# Patient Record
Sex: FEMALE | Race: BLACK OR AFRICAN AMERICAN | NOT HISPANIC OR LATINO | URBAN - METROPOLITAN AREA
[De-identification: names, ages, dates, MRNs, and addresses within clinical notes are randomized per-mention and may not be internally consistent; named-entity substitution may affect disease eponyms.]

---

## 2018-06-19 ENCOUNTER — EMERGENCY (EMERGENCY)
Facility: HOSPITAL | Age: 53
LOS: 1 days | Discharge: HOME | End: 2018-06-19
Attending: EMERGENCY MEDICINE
Payer: SELF-PAY

## 2018-06-19 VITALS
RESPIRATION RATE: 14 BRPM | SYSTOLIC BLOOD PRESSURE: 150 MMHG | WEIGHT: 190.04 LBS | OXYGEN SATURATION: 97 % | HEART RATE: 68 BPM | DIASTOLIC BLOOD PRESSURE: 60 MMHG | TEMPERATURE: 98 F

## 2018-06-19 PROCEDURE — 99283 EMERGENCY DEPT VISIT LOW MDM: CPT

## 2018-06-19 RX ORDER — ACETAMINOPHEN 500 MG
650 TABLET ORAL ONCE
Qty: 0 | Refills: 0 | Status: COMPLETED | OUTPATIENT
Start: 2018-06-19 | End: 2018-06-19

## 2018-06-19 RX ORDER — SULFACETAMIDE SODIUM 10 %
1 DROPS OPHTHALMIC (EYE)
Qty: 1 | Refills: 0 | OUTPATIENT
Start: 2018-06-19 | End: 2018-06-25

## 2018-06-19 RX ADMIN — Medication 650 MILLIGRAM(S): at 14:44

## 2018-06-19 NOTE — ED PROVIDER NOTE - PROGRESS NOTE DETAILS
left corneal abrasion, rx opthalmic abx sent to pharmacy, given information for Dr Ruiz, opthalmologist, given 1 bottle of eyewash, advised otc tylenol or motrin as directed for pain, any concerns return to ed

## 2018-06-19 NOTE — ED PROVIDER NOTE - ATTENDING CONTRIBUTION TO CARE
I have personally performed a face to face diagnostic evaluation on this patient.  I have reviewed the PA note and agree with the history, exam, and plan of care, except as noted.  History and Exam by me shows  left eye irritated today p fb sensation, but rinse it out water.  Left eye: conjunctival injection c linear superficial abrasion at 3 o'clock.

## 2018-06-19 NOTE — ED PROVIDER NOTE - OBJECTIVE STATEMENT
53 y female states she rubbed her left  eye today, states she felt something in it, she washed her eye out with water.  then began to feel pain with movement of the eye , does not wear glasses or contacts,  utd on tetanus.  nonsmoker. 53 y female states she rubbed her left  eye today, states she felt something in it, she washed her eye out with water.  then began to feel pain with movement of the eye , does not wear glasses or contacts,  utd on tetanus.  nonsmoker.  no other complaint. 53 y female states she rubbed her left  eye today, states she felt something in it, she washed her eye out with water.  then began to feel pain with movement of the eye , does not wear glasses or contacts,  utd on tetanus.  nonsmoker.  vision is clear.

## 2018-06-19 NOTE — ED PROVIDER NOTE - CHPI ED SYMPTOMS NEG
no photophobia/no purulent drainage/no blurred vision/no double vision/no eye lid swelling/no discharge/no foreign body/no itching/no drainage

## 2019-02-19 ENCOUNTER — LABORATORY RESULT (OUTPATIENT)
Age: 54
End: 2019-02-19

## 2019-02-19 ENCOUNTER — APPOINTMENT (OUTPATIENT)
Dept: INTERNAL MEDICINE | Facility: CLINIC | Age: 54
End: 2019-02-19
Payer: MEDICARE

## 2019-02-19 VITALS — DIASTOLIC BLOOD PRESSURE: 76 MMHG | SYSTOLIC BLOOD PRESSURE: 122 MMHG

## 2019-02-19 VITALS
HEIGHT: 65 IN | BODY MASS INDEX: 36.32 KG/M2 | WEIGHT: 218 LBS | HEART RATE: 76 BPM | SYSTOLIC BLOOD PRESSURE: 142 MMHG | TEMPERATURE: 97.9 F | DIASTOLIC BLOOD PRESSURE: 84 MMHG | OXYGEN SATURATION: 97 % | RESPIRATION RATE: 14 BRPM

## 2019-02-19 DIAGNOSIS — Z80.8 FAMILY HISTORY OF MALIGNANT NEOPLASM OF OTHER ORGANS OR SYSTEMS: ICD-10-CM

## 2019-02-19 DIAGNOSIS — Z00.00 ENCOUNTER FOR GENERAL ADULT MEDICAL EXAMINATION W/OUT ABNORMAL FINDINGS: ICD-10-CM

## 2019-02-19 DIAGNOSIS — Z78.9 OTHER SPECIFIED HEALTH STATUS: ICD-10-CM

## 2019-02-19 PROCEDURE — 99386 PREV VISIT NEW AGE 40-64: CPT

## 2019-02-19 PROCEDURE — G0438: CPT

## 2019-02-19 NOTE — PHYSICAL EXAM
[No Acute Distress] : no acute distress [Well Nourished] : well nourished [Well Developed] : well developed [Well-Appearing] : well-appearing [Normal Sclera/Conjunctiva] : normal sclera/conjunctiva [PERRL] : pupils equal round and reactive to light [EOMI] : extraocular movements intact [Normal Outer Ear/Nose] : the outer ears and nose were normal in appearance [Normal Oropharynx] : the oropharynx was normal [Normal TMs] : both tympanic membranes were normal [Normal Nasal Mucosa] : the nasal mucosa was normal [No JVD] : no jugular venous distention [Supple] : supple [No Lymphadenopathy] : no lymphadenopathy [Thyroid Normal, No Nodules] : the thyroid was normal and there were no nodules present [No Respiratory Distress] : no respiratory distress  [Clear to Auscultation] : lungs were clear to auscultation bilaterally [No Accessory Muscle Use] : no accessory muscle use [Normal Rate] : normal rate  [Regular Rhythm] : with a regular rhythm [Normal S1, S2] : normal S1 and S2 [No Murmur] : no murmur heard [No Edema] : there was no peripheral edema [Normal Appearance] : normal in appearance [No Masses] : no palpable masses [Soft] : abdomen soft [Non Tender] : non-tender [Normal Posterior Cervical Nodes] : no posterior cervical lymphadenopathy [Normal Anterior Cervical Nodes] : no anterior cervical lymphadenopathy [No CVA Tenderness] : no CVA  tenderness [No Spinal Tenderness] : no spinal tenderness [No Joint Swelling] : no joint swelling [Grossly Normal Strength/Tone] : grossly normal strength/tone [No Rash] : no rash [No Skin Lesions] : no skin lesions [Normal Gait] : normal gait [Coordination Grossly Intact] : coordination grossly intact [No Focal Deficits] : no focal deficits [Deep Tendon Reflexes (DTR)] : deep tendon reflexes were 2+ and symmetric [Normal Affect] : the affect was normal [Normal Insight/Judgement] : insight and judgment were intact

## 2019-02-19 NOTE — HEALTH RISK ASSESSMENT
[No falls in past year] : Patient reported no falls in the past year [0] : 2) Feeling down, depressed, or hopeless: Not at all (0) [] : No [Change in mental status noted] : No change in mental status noted [Language] : denies difficulty with language

## 2019-02-19 NOTE — COUNSELING
[Weight management counseling provided] : Weight management [Healthy eating counseling provided] : healthy eating [Activity counseling provided] : activity [Good understanding] : Patient has a good understanding of disease, goals and obesity follow-up plan

## 2019-02-19 NOTE — ASSESSMENT
[FreeTextEntry1] : she ate egg and cheese today a couple hours ago, but wants to do blood work today. \par \par needs weight loss

## 2019-02-20 LAB
FOLATE SERPL-MCNC: 9.1 NG/ML
VIT B12 SERPL-MCNC: 496 PG/ML

## 2019-02-21 LAB
25(OH)D3 SERPL-MCNC: 5.3 NG/ML
ALBUMIN SERPL ELPH-MCNC: 4.3 G/DL
ALP BLD-CCNC: 119 U/L
ALT SERPL-CCNC: 16 U/L
ANION GAP SERPL CALC-SCNC: 14 MMOL/L
APPEARANCE: ABNORMAL
AST SERPL-CCNC: 20 U/L
BASOPHILS # BLD AUTO: 0.03 K/UL
BASOPHILS NFR BLD AUTO: 0.4 %
BILIRUB SERPL-MCNC: <0.2 MG/DL
BILIRUBIN URINE: NEGATIVE
BLOOD URINE: NEGATIVE
BUN SERPL-MCNC: 11 MG/DL
CALCIUM SERPL-MCNC: 9.7 MG/DL
CHLORIDE SERPL-SCNC: 104 MMOL/L
CHOLEST SERPL-MCNC: 195 MG/DL
CHOLEST/HDLC SERPL: 3.2 RATIO
CO2 SERPL-SCNC: 23 MMOL/L
COLOR: YELLOW
CREAT SERPL-MCNC: 0.72 MG/DL
EOSINOPHIL # BLD AUTO: 0.12 K/UL
EOSINOPHIL NFR BLD AUTO: 1.7 %
GLUCOSE QUALITATIVE U: NEGATIVE
GLUCOSE SERPL-MCNC: 88 MG/DL
HBA1C MFR BLD HPLC: 5.5 %
HCT VFR BLD CALC: 41.7 %
HDLC SERPL-MCNC: 62 MG/DL
HGB BLD-MCNC: 11.7 G/DL
IMM GRANULOCYTES NFR BLD AUTO: 0.3 %
KETONES URINE: NEGATIVE
LDLC SERPL CALC-MCNC: 72 MG/DL
LEUKOCYTE ESTERASE URINE: ABNORMAL
LYMPHOCYTES # BLD AUTO: 1.87 K/UL
LYMPHOCYTES NFR BLD AUTO: 26.8 %
MAN DIFF?: NORMAL
MCHC RBC-ENTMCNC: 24.2 PG
MCHC RBC-ENTMCNC: 28.1 GM/DL
MCV RBC AUTO: 86.3 FL
MONOCYTES # BLD AUTO: 0.39 K/UL
MONOCYTES NFR BLD AUTO: 5.6 %
NEUTROPHILS # BLD AUTO: 4.54 K/UL
NEUTROPHILS NFR BLD AUTO: 65.2 %
NITRITE URINE: NEGATIVE
PH URINE: 6
PLATELET # BLD AUTO: 288 K/UL
POTASSIUM SERPL-SCNC: 4.5 MMOL/L
PROT SERPL-MCNC: 7.9 G/DL
PROTEIN URINE: NORMAL
RBC # BLD: 4.83 M/UL
RBC # FLD: 15.7 %
SODIUM SERPL-SCNC: 141 MMOL/L
SPECIFIC GRAVITY URINE: 1.03
TRIGL SERPL-MCNC: 305 MG/DL
TSH SERPL-ACNC: 2.43 UIU/ML
URATE SERPL-MCNC: 4 MG/DL
UROBILINOGEN URINE: NORMAL
WBC # FLD AUTO: 6.97 K/UL

## 2019-02-21 RX ORDER — MULTIVIT-MIN/FOLIC/VIT K/LYCOP 400-300MCG
50 MCG TABLET ORAL
Qty: 30 | Refills: 5 | Status: ACTIVE | COMMUNITY
Start: 2019-02-21

## 2019-06-08 ENCOUNTER — APPOINTMENT (OUTPATIENT)
Dept: INTERNAL MEDICINE | Facility: CLINIC | Age: 54
End: 2019-06-08
Payer: COMMERCIAL

## 2019-06-08 VITALS
HEIGHT: 65 IN | DIASTOLIC BLOOD PRESSURE: 90 MMHG | SYSTOLIC BLOOD PRESSURE: 130 MMHG | BODY MASS INDEX: 36.32 KG/M2 | HEART RATE: 79 BPM | WEIGHT: 218 LBS | OXYGEN SATURATION: 99 % | RESPIRATION RATE: 14 BRPM | TEMPERATURE: 97.9 F

## 2019-06-08 VITALS — SYSTOLIC BLOOD PRESSURE: 120 MMHG | DIASTOLIC BLOOD PRESSURE: 86 MMHG

## 2019-06-08 DIAGNOSIS — E55.9 VITAMIN D DEFICIENCY, UNSPECIFIED: ICD-10-CM

## 2019-06-08 DIAGNOSIS — E78.1 PURE HYPERGLYCERIDEMIA: ICD-10-CM

## 2019-06-08 PROCEDURE — 99213 OFFICE O/P EST LOW 20 MIN: CPT

## 2019-06-08 NOTE — PHYSICAL EXAM
[Well Nourished] : well nourished [No Acute Distress] : no acute distress [Clear to Auscultation] : lungs were clear to auscultation bilaterally [No Respiratory Distress] : no respiratory distress  [Well Developed] : well developed [No Edema] : there was no peripheral edema [Normal Rate] : normal rate  [Regular Rhythm] : with a regular rhythm [Non Tender] : non-tender [Soft] : abdomen soft [Normal Affect] : the affect was normal [Normal Insight/Judgement] : insight and judgment were intact

## 2019-06-08 NOTE — HISTORY OF PRESENT ILLNESS
[FreeTextEntry1] : BP check [de-identified] : Pt is here for BP check and follow up. Had job physical and BP was very good. \par \par Trying to eat healthier to lose weight.

## 2019-06-09 LAB
25(OH)D3 SERPL-MCNC: 9.5 NG/ML
CHOLEST SERPL-MCNC: 181 MG/DL
CHOLEST/HDLC SERPL: 4.5 RATIO
HDLC SERPL-MCNC: 40 MG/DL
LDLC SERPL CALC-MCNC: 120 MG/DL
TRIGL SERPL-MCNC: 106 MG/DL

## 2019-10-25 NOTE — HISTORY OF PRESENT ILLNESS
[] : Resident [FreeTextEntry3] : 64yoF w/ h/o CVA, TIAs, OA who ambulates w/ cane here for renewal of reduced-face metrocard.  Awaiting MR re: episodes of vision loss.  Migraines controlled on tylenol prn.\par AFVSS, exam per resident\par UTD cscope, mammo\par Refuses flu vacc\par Complete form [FreeTextEntry1] : cpe [de-identified] : Pt is a  and gets a physical yearly.

## 2020-12-31 ENCOUNTER — APPOINTMENT (OUTPATIENT)
Dept: INTERNAL MEDICINE | Facility: CLINIC | Age: 55
End: 2020-12-31

## 2021-10-13 ENCOUNTER — TRANSCRIPTION ENCOUNTER (OUTPATIENT)
Age: 56
End: 2021-10-13

## 2023-05-12 NOTE — ED PROVIDER NOTE - MUSCULOSKELETAL NEGATIVE STATEMENT, MLM
PROVIDER:[TOKEN:[[id number]],FOLLOWUP:[1-3 Days]]
no back pain, no gout, no musculoskeletal pain, no neck pain, and no weakness.

## 2023-06-19 NOTE — ED PROVIDER NOTE - GENITOURINARY NEGATIVE STATEMENT, MLM
I called patient he has appointmentfor evaluation of multiple sclerosis  I have referredpatient to dr Linder pulmon to evaluate patientary t patient to Dr. Howard howard pulmonary to evaluate patient for chronic pulmonary emboli and chronic dvt and when  And how long to be off of anticoagulation for colonscopy  Tel no for DR Linder provided  And told patient to have evaluation for multiple sclerosis before going for colonscopy because of abnormal mri lesions   clarice patint to schedule follow up appointment  with me  
Patient scheduled office visit with Nickie MAYO for 8/14/23. Will close encounter.   
Service to GI order placed  Diagnoses   Constipation, unspecified constipation type [K59.00]   Colon cancer screening [Z12.11]     Please schedule consult with Dr. CORBETT or [facility].  
Tried to call patient to schedule office visit with Dr Jordan or midlevel and phone just rings. Will try again.   
no dysuria, no frequency, and no hematuria.

## 2024-07-09 ENCOUNTER — NON-APPOINTMENT (OUTPATIENT)
Age: 59
End: 2024-07-09